# Patient Record
Sex: FEMALE | Race: WHITE | NOT HISPANIC OR LATINO | URBAN - METROPOLITAN AREA
[De-identification: names, ages, dates, MRNs, and addresses within clinical notes are randomized per-mention and may not be internally consistent; named-entity substitution may affect disease eponyms.]

---

## 2021-02-24 ENCOUNTER — OFFICE VISIT (OUTPATIENT)
Dept: URBAN - METROPOLITAN AREA CLINIC 12 | Facility: CLINIC | Age: 36
End: 2021-02-24
Payer: OTHER GOVERNMENT

## 2021-02-24 VITALS
DIASTOLIC BLOOD PRESSURE: 81 MMHG | TEMPERATURE: 97.5 F | SYSTOLIC BLOOD PRESSURE: 135 MMHG | HEIGHT: 61 IN | WEIGHT: 126.2 LBS | BODY MASS INDEX: 23.83 KG/M2 | HEART RATE: 82 BPM

## 2021-02-24 DIAGNOSIS — Z92.89 S/P FECAL TRANSPLANT: ICD-10-CM

## 2021-02-24 DIAGNOSIS — Z86.19 HISTORY OF CLOSTRIDIOIDES DIFFICILE COLITIS: ICD-10-CM

## 2021-02-24 DIAGNOSIS — B37.9 CANDIDA INFECTION: ICD-10-CM

## 2021-02-24 PROCEDURE — 99203 OFFICE O/P NEW LOW 30 MIN: CPT | Performed by: STUDENT IN AN ORGANIZED HEALTH CARE EDUCATION/TRAINING PROGRAM

## 2021-02-24 RX ORDER — VANCOMYCIN HYDROCHLORIDE 250 MG/1
CAPSULE ORAL
Qty: 0 | Refills: 0 | Status: ON HOLD | COMMUNITY
Start: 1900-01-01

## 2021-02-24 RX ORDER — CICLOPIROX 80 MG/ML
APPLY TO THE AFFECTED AREA(S) BY TOPICAL ROUTE ONCE DAILY PREFERABLY AT BEDTIME OR 8 HOURS BEFORE WASHING SOLUTION TOPICAL 1
Qty: 1 | Refills: 0 | Status: ON HOLD | COMMUNITY
Start: 1900-01-01

## 2021-02-24 NOTE — HPI-OTHER HISTORIES
35 F with h/o recurrent C difficile in 2015, s/p fecal transplant with improvement in symptoms. Had a candida infection of her skin and was given diflucan. After this she had symptoms of bloating, however this has mostly improved.  She reports she currently has athlete's foot and is pending initiation of a long term antifungal treatment, however is hesitant given her h/o CDI Denies diarrhea at this time. Bowel movements are normal.  She has had antibiotics since her CDI and did not develop recurrent CDI. She is nervous about getting C diff again, and wants to know if increased risk with antifungal/antiviral treatments. Not pending initiation of antibiotics, but is possibly going to be starting valtrex and antifungal Reports having oral sores and may be starting treatment with valtrex

## 2021-03-02 ENCOUNTER — TELEPHONE ENCOUNTER (OUTPATIENT)
Dept: URBAN - METROPOLITAN AREA CLINIC 12 | Facility: CLINIC | Age: 36
End: 2021-03-02

## 2021-03-02 RX ORDER — CICLOPIROX 80 MG/ML
APPLY TO THE AFFECTED AREA(S) BY TOPICAL ROUTE ONCE DAILY PREFERABLY AT BEDTIME OR 8 HOURS BEFORE WASHING SOLUTION TOPICAL 1
Qty: 1 | Refills: 0 | Status: ON HOLD | COMMUNITY
Start: 1900-01-01

## 2021-03-02 RX ORDER — VANCOMYCIN HYDROCHLORIDE 250 MG/1
CAPSULE ORAL
Qty: 0 | Refills: 0 | Status: ON HOLD | COMMUNITY
Start: 1900-01-01

## 2021-03-02 RX ORDER — LACTOBACIL 2/BIFIDO 1/S.THERMO 900B CELL
AS DIRECTED PACKET (EA) ORAL
Qty: 30 | Refills: 3 | OUTPATIENT
Start: 2021-03-03 | End: 2021-07-01

## 2021-07-07 ENCOUNTER — OFFICE VISIT (OUTPATIENT)
Dept: URBAN - METROPOLITAN AREA CLINIC 128 | Facility: CLINIC | Age: 36
End: 2021-07-07

## 2022-02-09 ENCOUNTER — OFFICE VISIT (OUTPATIENT)
Dept: URBAN - METROPOLITAN AREA CLINIC 19 | Facility: CLINIC | Age: 37
End: 2022-02-09
Payer: OTHER GOVERNMENT

## 2022-02-09 ENCOUNTER — WEB ENCOUNTER (OUTPATIENT)
Dept: URBAN - METROPOLITAN AREA CLINIC 19 | Facility: CLINIC | Age: 37
End: 2022-02-09

## 2022-02-09 VITALS
BODY MASS INDEX: 22.51 KG/M2 | HEART RATE: 84 BPM | WEIGHT: 119.2 LBS | HEIGHT: 61 IN | SYSTOLIC BLOOD PRESSURE: 116 MMHG | DIASTOLIC BLOOD PRESSURE: 74 MMHG | TEMPERATURE: 98.4 F

## 2022-02-09 DIAGNOSIS — Z83.71 FAMILY HISTORY OF COLONIC POLYPS: ICD-10-CM

## 2022-02-09 DIAGNOSIS — R93.2 ABNORMAL GALL BLADDER DIAGNOSTIC IMAGING: ICD-10-CM

## 2022-02-09 DIAGNOSIS — R10.13 EPIGASTRIC PAIN: ICD-10-CM

## 2022-02-09 DIAGNOSIS — Z80.0 FAMILY HISTORY OF COLON CANCER: ICD-10-CM

## 2022-02-09 PROCEDURE — 99214 OFFICE O/P EST MOD 30 MIN: CPT | Performed by: INTERNAL MEDICINE

## 2022-02-09 RX ORDER — CICLOPIROX 80 MG/ML
APPLY TO THE AFFECTED AREA(S) BY TOPICAL ROUTE ONCE DAILY PREFERABLY AT BEDTIME OR 8 HOURS BEFORE WASHING SOLUTION TOPICAL 1
Qty: 1 | Refills: 0 | Status: ACTIVE | COMMUNITY
Start: 1900-01-01

## 2022-02-09 RX ORDER — VANCOMYCIN HYDROCHLORIDE 250 MG/1
CAPSULE ORAL
Qty: 0 | Refills: 0 | Status: ACTIVE | COMMUNITY
Start: 1900-01-01

## 2022-02-09 RX ORDER — POLYETHYLENE GLYCOL 3350, SODIUM SULFATE, SODIUM CHLORIDE, POTASSIUM CHLORIDE, ASCORBIC ACID, SODIUM ASCORBATE 140-9-5.2G
AS DIRECTED KIT ORAL ONCE
Qty: 140 GRAM | Refills: 0 | OUTPATIENT
Start: 2022-02-09 | End: 2022-02-10

## 2022-02-09 NOTE — PHYSICAL EXAM HENT:
Head,  normocephalic,  atraumatic,  Face,  Face within normal limits,  Ears,  External ears within normal limits,  Nose/Nasopharynx,  External nose  normal appearance,  nares patent,  no nasal discharge,  Mouth and Throat,  Oral cavity appearance normal,  Breath odor normal,  Lips,  Appearance normal
No

## 2022-02-09 NOTE — HPI-TODAY'S VISIT:
pt previoulsy seen by dr. quintero for persistent c diff requiring FMT in 2015.   she comes in for ruq pain  pain started in 12/18/2021 it was ruq no n/v pain resolved with tumuric  she had ongoing diarrhea told she had gallbladder thickening pain comes in and goes mostly in the am alternates between the right and left side no assoc n/v stable stools  she has stopped the turmuric no fever or chills no clear improvement since notes some weight loss normal appetite  no other complaints paternal uncle with colon cancer at 38 yoa dad with polyps

## 2022-02-14 ENCOUNTER — TELEPHONE ENCOUNTER (OUTPATIENT)
Dept: URBAN - METROPOLITAN AREA CLINIC 40 | Facility: CLINIC | Age: 37
End: 2022-02-14

## 2022-02-16 ENCOUNTER — OFFICE VISIT (OUTPATIENT)
Dept: URBAN - METROPOLITAN AREA CLINIC 18 | Facility: CLINIC | Age: 37
End: 2022-02-16
Payer: OTHER GOVERNMENT

## 2022-02-16 DIAGNOSIS — K82.4 GALLBLADDER POLYP: ICD-10-CM

## 2022-02-16 DIAGNOSIS — R10.13 EPIGASTRIC PAIN: ICD-10-CM

## 2022-02-16 PROCEDURE — 76705 ECHO EXAM OF ABDOMEN: CPT | Performed by: INTERNAL MEDICINE

## 2022-02-17 LAB
A/G RATIO: 1.7
ALBUMIN: 4.3
ALKALINE PHOSPHATASE: 61
ALT (SGPT): 10
AST (SGOT): 16
BILIRUBIN, TOTAL: 0.2
BUN/CREATININE RATIO: 18
BUN: 15
CALCIUM: 9.5
CARBON DIOXIDE, TOTAL: 20
CHLORIDE: 102
CREATININE: 0.82
EGFR IF AFRICN AM: 106
EGFR IF NONAFRICN AM: 92
GLOBULIN, TOTAL: 2.6
GLUCOSE: 83
POTASSIUM: 5
PROTEIN, TOTAL: 6.9
SODIUM: 139

## 2022-03-01 ENCOUNTER — TELEPHONE ENCOUNTER (OUTPATIENT)
Dept: URBAN - METROPOLITAN AREA CLINIC 128 | Facility: CLINIC | Age: 37
End: 2022-03-01

## 2022-03-07 ENCOUNTER — TELEPHONE ENCOUNTER (OUTPATIENT)
Dept: URBAN - METROPOLITAN AREA CLINIC 92 | Facility: CLINIC | Age: 37
End: 2022-03-07

## 2022-03-14 ENCOUNTER — TELEPHONE ENCOUNTER (OUTPATIENT)
Dept: URBAN - METROPOLITAN AREA CLINIC 92 | Facility: CLINIC | Age: 37
End: 2022-03-14

## 2022-03-17 ENCOUNTER — TELEPHONE ENCOUNTER (OUTPATIENT)
Dept: URBAN - METROPOLITAN AREA CLINIC 92 | Facility: CLINIC | Age: 37
End: 2022-03-17

## 2022-03-22 ENCOUNTER — DASHBOARD ENCOUNTERS (OUTPATIENT)
Age: 37
End: 2022-03-22

## 2022-03-22 ENCOUNTER — OFFICE VISIT (OUTPATIENT)
Dept: URBAN - METROPOLITAN AREA CLINIC 128 | Facility: CLINIC | Age: 37
End: 2022-03-22
Payer: OTHER GOVERNMENT

## 2022-03-22 ENCOUNTER — WEB ENCOUNTER (OUTPATIENT)
Dept: URBAN - METROPOLITAN AREA SURGERY CENTER 31 | Facility: SURGERY CENTER | Age: 37
End: 2022-03-22

## 2022-03-22 DIAGNOSIS — Z80.0 FAMILY HISTORY OF COLON CANCER: ICD-10-CM

## 2022-03-22 DIAGNOSIS — K62.89 ANAL IRRITATION: ICD-10-CM

## 2022-03-22 DIAGNOSIS — Z83.71 FAMILY HISTORY OF COLONIC POLYPS: ICD-10-CM

## 2022-03-22 PROCEDURE — 99213 OFFICE O/P EST LOW 20 MIN: CPT | Performed by: PHYSICIAN ASSISTANT

## 2022-03-22 RX ORDER — NYSTATIN 100000 [USP'U]/G
1 APPLICATION OINTMENT TOPICAL TWICE A DAY
Status: ACTIVE | COMMUNITY

## 2022-03-22 RX ORDER — NAFTIFINE HYDROCHLORIDE 10 MG/G
1 APPLICATION CREAM TOPICAL TWICE A DAY
Status: ACTIVE | COMMUNITY

## 2022-03-22 RX ORDER — CICLOPIROX 80 MG/ML
APPLY TO THE AFFECTED AREA(S) BY TOPICAL ROUTE ONCE DAILY PREFERABLY AT BEDTIME OR 8 HOURS BEFORE WASHING SOLUTION TOPICAL 1
Qty: 1 | Refills: 0 | Status: ON HOLD | COMMUNITY
Start: 1900-01-01

## 2022-03-22 RX ORDER — VANCOMYCIN HYDROCHLORIDE 250 MG/1
CAPSULE ORAL
Qty: 0 | Refills: 0 | Status: ON HOLD | COMMUNITY
Start: 1900-01-01

## 2022-03-22 NOTE — PHYSICAL EXAM GASTROINTESTINAL
Abdomen , soft, nontender, nondistended , no guarding or rigidity , no masses palpable , normal bowel sounds , Liver and Spleen , no hepatomegaly present Rectal  , normal sphincter tone, no external or internal hemorrhoids, no rectal masses or bleeding present. Rectal examination was performed with a chaperone present

## 2022-03-22 NOTE — HPI-OTHER HISTORIES
The patient presents today due to the following symptoms: anal irritation The patient had a history of C diff in 2015 and had to have a fecal transplant with Dr. Morrell. She was on an antibiotic and steroid for a respiratory issue last year and she developed fungal infection in her feet and an anal irritation. She puts nystatin powder on her rectal area but it is not helping anymore. She was having RUQ abdominal pain and her RUQ US ordered by PCP caused gallbladder thickening. Duration of symptoms: x 1 year What aggravates symptoms: sweating  Last colonoscopy: only a fecal transplant was done but no screening colonoscopy. Her father had colon polyps. Her paternal uncle had colon cancer in mid 40s She took a new probiotic and had one episode of diarrhea and mucous but now off of probiotic symptoms has resolved. SHe has 1 BM a day.

## 2022-03-25 ENCOUNTER — OFFICE VISIT (OUTPATIENT)
Dept: URBAN - METROPOLITAN AREA SURGERY CENTER 31 | Facility: SURGERY CENTER | Age: 37
End: 2022-03-25
Payer: OTHER GOVERNMENT

## 2022-03-25 DIAGNOSIS — Z12.11 AVERAGE RISK FOR CRC. DUE TO PT'S CO-MORBID STATE WITH END STAGE DEMENTIA, HIGH RISK FOR ANESTHESIA (PER NEUROLOGY); INABILITY TO TAKE A BOWEL PREP....WOULD NOT ADVISE ANY COLORECTAL CANCER SCREENING INCLUDING STOOL TEST FOR FECAL BLOOD.: ICD-10-CM

## 2022-03-25 DIAGNOSIS — Z80.0 BROTHER AT YOUNG AGE FAMILY HISTORY OF COLON CANCER: ICD-10-CM

## 2022-03-25 DIAGNOSIS — Z83.71 FAMILY HISTORY OF COLON POLYPS: ICD-10-CM

## 2022-03-25 PROCEDURE — 45378 DIAGNOSTIC COLONOSCOPY: CPT | Performed by: INTERNAL MEDICINE

## 2022-03-25 PROCEDURE — G8907 PT DOC NO EVENTS ON DISCHARG: HCPCS | Performed by: INTERNAL MEDICINE

## 2022-03-30 LAB
C DIFFICILE TOXINS A+B, EIA: NEGATIVE
CAMPYLOBACTER CULTURE: (no result)
E COLI SHIGA TOXIN EIA: NEGATIVE
Lab: (no result)
OVA + PARASITE EXAM: (no result)
SALMONELLA/SHIGELLA SCREEN: (no result)

## 2022-06-08 ENCOUNTER — TELEPHONE ENCOUNTER (OUTPATIENT)
Dept: URBAN - METROPOLITAN AREA CLINIC 128 | Facility: CLINIC | Age: 37
End: 2022-06-08

## 2022-10-10 ENCOUNTER — TELEPHONE ENCOUNTER (OUTPATIENT)
Dept: URBAN - METROPOLITAN AREA CLINIC 128 | Facility: CLINIC | Age: 37
End: 2022-10-10

## 2022-12-27 ENCOUNTER — TELEPHONE ENCOUNTER (OUTPATIENT)
Dept: URBAN - METROPOLITAN AREA CLINIC 128 | Facility: CLINIC | Age: 37
End: 2022-12-27

## 2023-01-03 ENCOUNTER — TELEPHONE ENCOUNTER (OUTPATIENT)
Dept: URBAN - METROPOLITAN AREA CLINIC 19 | Facility: CLINIC | Age: 38
End: 2023-01-03

## 2023-02-22 ENCOUNTER — OFFICE VISIT (OUTPATIENT)
Dept: URBAN - METROPOLITAN AREA CLINIC 18 | Facility: CLINIC | Age: 38
End: 2023-02-22
Payer: OTHER GOVERNMENT

## 2023-02-22 DIAGNOSIS — K82.4 GALLBLADDER POLYP: ICD-10-CM

## 2023-02-22 PROCEDURE — 76705 ECHO EXAM OF ABDOMEN: CPT | Performed by: INTERNAL MEDICINE

## 2023-03-03 ENCOUNTER — TELEPHONE ENCOUNTER (OUTPATIENT)
Dept: URBAN - METROPOLITAN AREA CLINIC 19 | Facility: CLINIC | Age: 38
End: 2023-03-03

## 2024-09-25 ENCOUNTER — TELEPHONE ENCOUNTER (OUTPATIENT)
Dept: URBAN - METROPOLITAN AREA CLINIC 128 | Facility: CLINIC | Age: 39
End: 2024-09-25

## 2024-09-26 ENCOUNTER — OFFICE VISIT (OUTPATIENT)
Dept: URBAN - METROPOLITAN AREA CLINIC 19 | Facility: CLINIC | Age: 39
End: 2024-09-26

## 2024-11-20 ENCOUNTER — OFFICE VISIT (OUTPATIENT)
Dept: URBAN - METROPOLITAN AREA CLINIC 19 | Facility: CLINIC | Age: 39
End: 2024-11-20

## 2024-11-25 ENCOUNTER — LAB OUTSIDE AN ENCOUNTER (OUTPATIENT)
Dept: URBAN - METROPOLITAN AREA CLINIC 19 | Facility: CLINIC | Age: 39
End: 2024-11-25

## 2024-11-25 ENCOUNTER — OFFICE VISIT (OUTPATIENT)
Dept: URBAN - METROPOLITAN AREA CLINIC 19 | Facility: CLINIC | Age: 39
End: 2024-11-25
Payer: OTHER GOVERNMENT

## 2024-11-25 VITALS
HEART RATE: 68 BPM | WEIGHT: 115.8 LBS | BODY MASS INDEX: 21.86 KG/M2 | HEIGHT: 61 IN | OXYGEN SATURATION: 100 % | TEMPERATURE: 98.1 F | DIASTOLIC BLOOD PRESSURE: 69 MMHG | SYSTOLIC BLOOD PRESSURE: 106 MMHG

## 2024-11-25 DIAGNOSIS — K82.4 GALLBLADDER POLYP: ICD-10-CM

## 2024-11-25 PROBLEM — 197433003: Status: ACTIVE | Noted: 2024-11-25

## 2024-11-25 PROCEDURE — 99213 OFFICE O/P EST LOW 20 MIN: CPT

## 2024-11-25 RX ORDER — CICLOPIROX 80 MG/ML
APPLY TO THE AFFECTED AREA(S) BY TOPICAL ROUTE ONCE DAILY PREFERABLY AT BEDTIME OR 8 HOURS BEFORE WASHING SOLUTION TOPICAL 1
Qty: 1 | Refills: 0 | Status: ON HOLD | COMMUNITY
Start: 1900-01-01

## 2024-11-25 RX ORDER — NAFTIFINE HYDROCHLORIDE 10 MG/G
1 APPLICATION CREAM TOPICAL TWICE A DAY
Status: ACTIVE | COMMUNITY

## 2024-11-25 RX ORDER — LORATADINE 10 MG/1
1 TABLET TABLET ORAL ONCE A DAY
Status: ACTIVE | COMMUNITY

## 2024-11-25 RX ORDER — NYSTATIN 100000 1/G
1 APPLICATION OINTMENT TOPICAL TWICE A DAY
Status: ACTIVE | COMMUNITY

## 2024-11-25 RX ORDER — VANCOMYCIN HYDROCHLORIDE 250 MG/1
CAPSULE ORAL
Qty: 0 | Refills: 0 | Status: ON HOLD | COMMUNITY
Start: 1900-01-01

## 2024-11-25 NOTE — HPI-TODAY'S VISIT:
Ms. Lewis presents today to schedule an ultrasound as recommend by Dr. Rodriguez to monitor GB polyp. She was last seen on 3/22/2022 by GHADA Vigil for anal irritation and to schedule a colonoscopy.  She is doing well. Reports having a normal bowel movement daily. She gets a "dull ache" around her GB area twice weekly.  Previous GI workup: 2/22/2023 abdominal US: Tiny gallbladder polyp is noted, this measures approximately 2 mm. 3/25/2022 colonoscopy with Dr. Rodriguez: Tortuous colon, the exam was otherwise normal.  No specimens collected.  Recommended to repeat in 5 years (2027). 2/16/2022 RUQ US: Small gallbladder polyp with ringdown artifact.  Consider hyperplastic cholecystitis.  Follow-up RUQ US in 12 months recommended.

## 2025-01-10 ENCOUNTER — OFFICE VISIT (OUTPATIENT)
Dept: URBAN - METROPOLITAN AREA CLINIC 18 | Facility: CLINIC | Age: 40
End: 2025-01-10

## 2025-01-24 ENCOUNTER — OFFICE VISIT (OUTPATIENT)
Dept: URBAN - METROPOLITAN AREA CLINIC 18 | Facility: CLINIC | Age: 40
End: 2025-01-24
Payer: OTHER GOVERNMENT

## 2025-01-24 DIAGNOSIS — K82.4 GALLBLADDER POLYP: ICD-10-CM

## 2025-01-24 PROCEDURE — 76705 ECHO EXAM OF ABDOMEN: CPT | Performed by: INTERNAL MEDICINE

## 2025-06-03 ENCOUNTER — TELEPHONE ENCOUNTER (OUTPATIENT)
Dept: URBAN - METROPOLITAN AREA CLINIC 19 | Facility: CLINIC | Age: 40
End: 2025-06-03

## 2025-07-03 ENCOUNTER — TELEPHONE ENCOUNTER (OUTPATIENT)
Dept: URBAN - METROPOLITAN AREA CLINIC 19 | Facility: CLINIC | Age: 40
End: 2025-07-03

## 2025-07-25 ENCOUNTER — OFFICE VISIT (OUTPATIENT)
Dept: URBAN - METROPOLITAN AREA CLINIC 18 | Facility: CLINIC | Age: 40
End: 2025-07-25
Payer: OTHER GOVERNMENT

## 2025-07-25 DIAGNOSIS — K82.4 GALLBLADDER POLYP: ICD-10-CM

## 2025-07-25 PROCEDURE — 76705 ECHO EXAM OF ABDOMEN: CPT | Performed by: INTERNAL MEDICINE

## 2025-07-29 ENCOUNTER — TELEPHONE ENCOUNTER (OUTPATIENT)
Dept: URBAN - METROPOLITAN AREA CLINIC 19 | Facility: CLINIC | Age: 40
End: 2025-07-29